# Patient Record
Sex: FEMALE | Race: NATIVE HAWAIIAN OR OTHER PACIFIC ISLANDER
[De-identification: names, ages, dates, MRNs, and addresses within clinical notes are randomized per-mention and may not be internally consistent; named-entity substitution may affect disease eponyms.]

---

## 2020-02-15 ENCOUNTER — HOSPITAL ENCOUNTER (EMERGENCY)
Dept: HOSPITAL 33 - ED | Age: 35
Discharge: HOME | End: 2020-02-15
Payer: COMMERCIAL

## 2020-02-15 VITALS — HEART RATE: 92 BPM | DIASTOLIC BLOOD PRESSURE: 101 MMHG | SYSTOLIC BLOOD PRESSURE: 155 MMHG

## 2020-02-15 VITALS — OXYGEN SATURATION: 98 %

## 2020-02-15 DIAGNOSIS — E87.6: ICD-10-CM

## 2020-02-15 DIAGNOSIS — R07.89: ICD-10-CM

## 2020-02-15 DIAGNOSIS — E11.9: ICD-10-CM

## 2020-02-15 DIAGNOSIS — I10: ICD-10-CM

## 2020-02-15 DIAGNOSIS — E78.00: ICD-10-CM

## 2020-02-15 DIAGNOSIS — K20.9: Primary | ICD-10-CM

## 2020-02-15 DIAGNOSIS — Z79.899: ICD-10-CM

## 2020-02-15 LAB
ALBUMIN SERPL-MCNC: 4.1 G/DL (ref 3.5–5)
ALP SERPL-CCNC: 73 U/L (ref 38–126)
ALT SERPL-CCNC: 18 U/L (ref 0–35)
AMYLASE SERPL-CCNC: 62 U/L (ref 30–110)
ANION GAP SERPL CALC-SCNC: 10.4 MEQ/L (ref 5–15)
APTT PPP: 33.7 SECONDS (ref 25.3–37)
AST SERPL QL: 23 U/L (ref 14–36)
BASOPHILS # BLD AUTO: 0.04 10*3/UL (ref 0–0.4)
BASOPHILS NFR BLD AUTO: 0.4 % (ref 0–0.4)
BILIRUB BLD-MCNC: 0.5 MG/DL (ref 0.2–1.3)
BNP SERPL-MCNC: 35.6 PG/ML (ref 0–450)
BUN SERPL-MCNC: 7 MG/DL (ref 7–17)
CALCIUM SPEC-MCNC: 8.8 MG/DL (ref 8.4–10.2)
CHLORIDE SERPL-SCNC: 104 MMOL/L (ref 98–107)
CO2 SERPL-SCNC: 29 MMOL/L (ref 22–30)
CREAT SERPL-MCNC: 0.73 MG/DL (ref 0.52–1.04)
D DIMER BLD IA.RAPID-MCNC: 239 NG/ML (ref 215–500)
EOSINOPHIL # BLD AUTO: 0.21 10*3/UL (ref 0–0.5)
GLUCOSE SERPL-MCNC: 152 MG/DL (ref 74–106)
GLUCOSE UR-MCNC: NEGATIVE MG/DL
HCT VFR BLD AUTO: 41.9 % (ref 35–47)
HGB BLD-MCNC: 14.2 GM/DL (ref 12–16)
INR PPP: 1.18 (ref 0.8–3)
LIPASE SERPL-CCNC: 143 U/L (ref 23–300)
LYMPHOCYTES # SPEC AUTO: 2.47 10*3/UL (ref 1–4.6)
MCH RBC QN AUTO: 30.3 PG (ref 26–32)
MCHC RBC AUTO-ENTMCNC: 33.9 G/DL (ref 32–36)
MONOCYTES # BLD AUTO: 0.57 10*3/UL (ref 0–1.3)
PLATELET # BLD AUTO: 251 K/MM3 (ref 150–450)
POTASSIUM SERPLBLD-SCNC: 3.3 MMOL/L (ref 3.5–5.1)
PROT SERPL-MCNC: 7.5 G/DL (ref 6.3–8.2)
PROT UR STRIP-MCNC: NEGATIVE MG/DL
PROTHROMBIN TIME: 13.4 SECONDS (ref 9.95–12.35)
RBC # BLD AUTO: 4.68 M/MM3 (ref 4.1–5.4)
RBC #/AREA URNS HPF: (no result) /HPF (ref 0–2)
SODIUM SERPL-SCNC: 140 MMOL/L (ref 137–145)
WBC # BLD AUTO: 10.9 K/MM3 (ref 4–10.5)
WBC #/AREA URNS HPF: (no result) /HPF (ref 0–5)

## 2020-02-15 PROCEDURE — 36000 PLACE NEEDLE IN VEIN: CPT

## 2020-02-15 PROCEDURE — 83880 ASSAY OF NATRIURETIC PEPTIDE: CPT

## 2020-02-15 PROCEDURE — 96375 TX/PRO/DX INJ NEW DRUG ADDON: CPT

## 2020-02-15 PROCEDURE — 83605 ASSAY OF LACTIC ACID: CPT

## 2020-02-15 PROCEDURE — 96360 HYDRATION IV INFUSION INIT: CPT

## 2020-02-15 PROCEDURE — 85025 COMPLETE CBC W/AUTO DIFF WBC: CPT

## 2020-02-15 PROCEDURE — 82150 ASSAY OF AMYLASE: CPT

## 2020-02-15 PROCEDURE — 96374 THER/PROPH/DIAG INJ IV PUSH: CPT

## 2020-02-15 PROCEDURE — 84484 ASSAY OF TROPONIN QUANT: CPT

## 2020-02-15 PROCEDURE — 93005 ELECTROCARDIOGRAM TRACING: CPT

## 2020-02-15 PROCEDURE — 85379 FIBRIN DEGRADATION QUANT: CPT

## 2020-02-15 PROCEDURE — 85730 THROMBOPLASTIN TIME PARTIAL: CPT

## 2020-02-15 PROCEDURE — 99285 EMERGENCY DEPT VISIT HI MDM: CPT

## 2020-02-15 PROCEDURE — 80053 COMPREHEN METABOLIC PANEL: CPT

## 2020-02-15 PROCEDURE — 83690 ASSAY OF LIPASE: CPT

## 2020-02-15 PROCEDURE — 71045 X-RAY EXAM CHEST 1 VIEW: CPT

## 2020-02-15 PROCEDURE — 36415 COLL VENOUS BLD VENIPUNCTURE: CPT

## 2020-02-15 PROCEDURE — 87086 URINE CULTURE/COLONY COUNT: CPT

## 2020-02-15 PROCEDURE — 93041 RHYTHM ECG TRACING: CPT

## 2020-02-15 PROCEDURE — 85610 PROTHROMBIN TIME: CPT

## 2020-02-15 PROCEDURE — 81001 URINALYSIS AUTO W/SCOPE: CPT

## 2020-02-15 NOTE — XRAY
Indication: Chest pain.



Comparison: None



Portable chest demonstrates normal heart, lungs, and bony thorax.

## 2020-02-15 NOTE — ERPHSYRPT
- History of Present Illness


Time Seen by Provider: 02/15/20 17:25


Historian: patient


Exam Limitations: no limitations


Patient Subjective Stated Complaint: Pain in medial chest that radiates to her 

back for past 2 days, pt is on Clindamycin for a sinus infection


Triage Nursing Assessment: Pt brought in to the ER by her , hypertensive

, no edema, rates pain 7/10, S1-S2 sounds heard, lungs clear,


Physician History: 





It is a 34-year-old white female who presents with a complaint of substernal 

chest pain which radiates through to the back for 2 days.  The pain seems to be 

getting worse it is worse when she lays down and she cannot sleep because of 

the pain she initially had some fever and was being treated for a sinus 

infection with Cleocin.  She also has some cough.  Her medicine and the Cleocin 

is new to her.  This started Thursday evening she does have multiple risk 

factors for heart disease including diabetes smoking hypertension positive 

family history and hyperlipidemia.


Timing/Duration: yesterday (2), worse


Activities at Onset: none


Quality: fullness, pressure


Location: substernal


Chest Pain Radiation: back


Severity of Pain-Max: moderate


Severity of Pain-Current: moderate


Modifying Factors: Improves With: nothing


Associated Symptoms: denies symptoms


Prior Chest Pain/Cardiac Workup: no prior chest pain


Nitro Today/Relief: no nitro taken today


Aspirin Treatment Today: no aspirin today


Allergies/Adverse Reactions: 








No Known Drug Allergies Allergy (Verified 02/15/20 17:44)


 





Home Medications: 








Fenofibrate 40 mg PO DAILY 02/15/20 [History]


Hydrochlorothiazide 12.5 mg PO DAILY 02/15/20 [History]


Metformin HCl 500 mg*** [Glucophage 500 MG***] 500 mg PO BIDWM 02/15/20 [History

]


Metoprolol Tartrate 50 mg PO BID 02/15/20 [History]


Nitroglycerin 0.4 mg Tablet*** [Nitrostat 0.4 MG Tablet***] 0.4 mg SL UD PRN 02/

15/20 [History]


Potassium Chloride 10 Meq Tab* [Klor Con 10 MEQ***] 10 meq PO DAILY 02/15/20 [

History]


lisinopriL [Zestril] 40 mg PO BID 02/15/20 [History]








- Review of Systems


Constitutional: Fever, No Chills


Eyes: No Symptoms


Ears, Nose, & Throat: No Symptoms


Respiratory: No Cough, No Dyspnea


Cardiac: Chest Pain, No Edema, No Syncope


Abdominal/Gastrointestinal: No Abdominal Pain, No Nausea, No Vomiting, No 

Diarrhea


Genitourinary Symptoms: No Dysuria


Musculoskeletal: No Back Pain, No Neck Pain


Skin: No Rash


Neurological: No Dizziness, No Focal Weakness, No Sensory Changes


Psychological: No Symptoms


Endocrine: No Symptoms


All Other Systems: Reviewed and Negative





- Past Medical History


Pertinent Past Medical History: Yes


Cardiac History: High Cholesterol, Hypertension


Endocrine Medical History: Diabetes Type II


Other Medical History: arteries of the heart spasm.  hypokalemia





- Past Surgical History


Past Surgical History: Yes


Gastrointestinal: Cholecystectomy


Female Surgical History: Hysterectomy, Tubal Ligation





- Social History


Smoking Status: Current every day smoker


How long have you smoked: 17 years


Exposure to second hand smoke: Yes


Drug Use: none


Patient Lives Alone: No





- Female History


Hx Pregnant Now: No





- Nursing Vital Signs


Nursing Vital Signs: 


 Initial Vital Signs











Temperature  97.9 F   02/15/20 17:30


 


Pulse Rate  98 H  02/15/20 17:30


 


Blood Pressure  158/100   02/15/20 17:30


 


O2 Sat by Pulse Oximetry  97   02/15/20 17:30








 Pain Scale











Pain Intensity                 4

















- Physical Exam


General Appearance: moderate distress


Eye Exam: PERRL/EOMI, eyes nml inspection


Ears, Nose, Throat Exam: normal ENT inspection, moist mucous membranes


Neck Exam: normal inspection, non-tender, supple, full range of motion


Respiratory Exam: normal breath sounds, lungs clear, No respiratory distress


Cardiovascular Exam: regular rate/rhythm, normal heart sounds


Gastrointestinal/Abdomen Exam: soft, No tenderness, No mass


Back Exam: normal inspection, No CVA tenderness, No vertebral tenderness


Extremity Exam: normal inspection, normal range of motion


Neurologic Exam: alert, oriented x 3, cooperative, normal mood/affect, 

sensation nml, No motor deficits


Skin Exam: normal color, warm, dry


Lymphatic Exam: No adenopathy


**SpO2 Interpretation**: normal


SpO2: 98


O2 Delivery: Room Air





- Course


Nursing assessment & vital signs reviewed: Yes


EKG Interpreted by Me: RATE ([id number]), Sinus Rhythm, NORMAL AXIS, NORMAL INTERVALS, 

NORMAL QRS, Non-specific ST Changes





- Radiology Exams


  ** Chest


X-ray Interpretation: Interpreted by me, Negative


Ordered Tests: 


 Active Orders 24 hr











 Category Date Time Status


 


 Cardiac Monitor STAT Care  02/15/20 17:29 Active


 


 EKG-ER Only STAT Care  02/15/20 17:28 Active


 


 IV Insertion STAT Care  02/15/20 17:28 Active


 


 CHEST 1 VIEW (PORTABLE) Stat Exams  02/15/20 17:29 Completed


 


 AMYLASE Stat Lab  02/15/20 17:57 Completed


 


 CBC W DIFF Stat Lab  02/15/20 17:57 Completed


 


 CMP Stat Lab  02/15/20 17:57 Completed


 


 CULTURE,URINE Stat Lab  02/15/20 17:59 Received


 


 D-DIMER QUANTITATIVE Stat Lab  02/15/20 17:57 Completed


 


 LIPASE Stat Lab  02/15/20 17:57 Completed


 


 Lactic Acid Stat Lab  02/15/20 17:31 Completed


 


 NT PRO BNP Stat Lab  02/15/20 17:57 Completed


 


 PROTIME WITH INR Stat Lab  02/15/20 17:57 Completed


 


 PTT Stat Lab  02/15/20 17:57 Completed


 


 TROPONIN Q3H Lab  02/15/20 17:57 Completed


 


 TROPONIN Q3H Lab  02/15/20 20:30 Ordered


 


 TROPONIN Q3H Lab  02/15/20 23:30 Ordered


 


 TROPONIN Q3H Lab  02/16/20 02:30 Ordered


 


 TROPONIN Q3H Lab  02/16/20 05:30 Ordered


 


 UA W/RFX UR CULTURE Stat Lab  02/15/20 17:59 Completed








Medication Summary














Discontinued Medications














Generic Name Dose Route Start Last Admin





  Trade Name Freq  PRN Reason Stop Dose Admin


 


Al Hydrox/Mg Hydrox/Simethicone  Confirm  02/15/20 17:53  





  Maalox Es 30 Ml Unit Dose***  Administered  02/15/20 17:54  





  Dose   





  30 ml   





  .ROUTE   





  .STK-MED ONE   





     





     





     





     


 


Hydromorphone HCl  1 mg  02/15/20 17:33  02/15/20 17:58





  Hydromorphone 1 Mg/Ml Ampule***  IV  02/15/20 17:34  1 mg





  STAT ONE   Administration





     





     





     





     


 


Hydromorphone HCl  Confirm  02/15/20 17:52  





  Hydromorphone 1 Mg/Ml Ampule***  Administered  02/15/20 17:53  





  Dose   





  1 mg   





  .ROUTE   





  .STK-MED ONE   





     





     





     





     


 


Sodium Chloride  1,000 mls @ 999 mls/hr  02/15/20 17:28  02/15/20 17:58





  Sodium Chloride 0.9% 1000 Ml  IV  02/15/20 18:28  999 mls/hr





  .Q1H1M STA   Administration





     





     





     





     


 


Sodium Chloride  Confirm  02/15/20 17:53  





  Sodium Chloride 0.9% 1000 Ml  Administered  02/15/20 17:54  





  Dose   





  1,000 mls @ ud   





  .ROUTE   





  .STK-MED ONE   





     





     





     





     


 


Lidocaine HCl  Confirm  02/15/20 17:53  





  Xylocaine Hcl Viscous *  Administered  02/15/20 17:54  





  Dose   





  15 ml   





  .ROUTE   





  .STK-MED ONE   





     





     





     





     


 


Magnesium Hydroxide  45 ml  02/15/20 17:31  02/15/20 17:59





  Gi Cocktail 45 Ml (Maalox/Lidocaine)  PO  02/15/20 17:32  45 ml





  STAT ONE   Administration





     





     





     





     


 


Ondansetron HCl  4 mg  02/15/20 17:28  02/15/20 17:58





  Zofran 4 Mg/2 Ml Vial**  IV  02/15/20 17:29  4 mg





  STAT ONE   Administration





     





     





     





     


 


Ondansetron HCl  Confirm  02/15/20 17:52  





  Zofran 4 Mg/2 Ml Vial**  Administered  02/15/20 17:53  





  Dose   





  4 mg   





  .ROUTE   





  .STK-MED ONE   





     





     





     





     


 


Pantoprazole Sodium  40 mg  02/15/20 17:30  02/15/20 17:58





  Protonix 40 Mg Iv***  IV  02/15/20 17:31  40 mg





  STAT ONE   Administration





     





     





     





     


 


Pantoprazole Sodium  Confirm  02/15/20 17:52  





  Protonix 40 Mg Iv***  Administered  02/15/20 17:53  





  Dose   





  40 mg   





  IV   





  .STK-MED ONE   





     





     





     





     


 


Potassium Chloride  Confirm  02/15/20 18:36  





  Klor Con 10 Meq***  Administered  02/15/20 18:37  





  Dose   





  40 meq   





  PO   





  .STK-MED ONE   





     





     





     





     


 


Potassium Chloride  40 meq  02/15/20 18:37  02/15/20 18:43





  Klor Con 10 Meq***  PO  02/15/20 18:38  40 meq





  STAT ONE   Administration





     





     





     





     











Lab/Rad Data: 


 Laboratory Result Diagrams





 02/15/20 17:57 





 02/15/20 17:57 





 Laboratory Results











  02/15/20 02/15/20 02/15/20 Range/Units





  17:59 17:57 17:57 


 


WBC     (4.0-10.5)  K/mm3


 


RBC     (4.1-5.4)  M/mm3


 


Hgb     (12.0-16.0)  gm/dl


 


Hct     (35-47)  %


 


MCV     ()  fl


 


MCH     (26-32)  pg


 


MCHC     (32-36)  g/dl


 


RDW     (11.5-14.0)  %


 


Plt Count     (150-450)  K/mm3


 


MPV     (7.5-11.0)  fl


 


Gran %     (36.0-66.0)  %


 


Eos # (Auto)     (0-0.5)  


 


Absolute Lymphs (auto)     (1.0-4.6)  


 


Absolute Monos (auto)     (0.0-1.3)  


 


Lymphocytes %     (24.0-44.0)  %


 


Monocytes %     (0.0-12.0)  %


 


Eosinophils %     (0.00-5.0)  %


 


Basophils %     (0.0-0.4)  %


 


Absolute Granulocytes     (1.4-6.9)  


 


Basophils #     (0-0.4)  


 


PT    13.4 H  (9.95-12.35)  SECONDS


 


INR    1.18  (0.8-3.0)  


 


APTT    33.7  (25.3-37.0)  SECONDS


 


D-Dimer    239  (215-500)  ng/mL


 


Sodium     (137-145)  mmol/L


 


Potassium     (3.5-5.1)  mmol/L


 


Chloride     ()  mmol/L


 


Carbon Dioxide     (22-30)  mmol/L


 


Anion Gap     (5-15)  MEQ/L


 


BUN     (7-17)  mg/dL


 


Creatinine     (0.52-1.04)  mg/dL


 


Estimated GFR     ML/MIN


 


Glucose     ()  mg/dL


 


Lactic Acid     (0.4-2.0)  


 


Calcium     (8.4-10.2)  mg/dL


 


Total Bilirubin     (0.2-1.3)  mg/dL


 


AST     (14-36)  U/L


 


ALT     (0-35)  U/L


 


Alkaline Phosphatase     ()  U/L


 


Troponin I   < 0.012   (0.000-0.034)  ng/mL


 


NT-Pro-B Natriuret Pep     (0-450)  pg/mL


 


Serum Total Protein     (6.3-8.2)  g/dL


 


Albumin     (3.5-5.0)  g/dL


 


Amylase     ()  U/L


 


Lipase     ()  U/L


 


Urine Color  YELLOW    (YELLOW)  


 


Urine Appearance  SLIGHTLY CLOUDY    (CLEAR)  


 


Urine pH  7.0    (5-6)  


 


Ur Specific Gravity  1.008    (1.005-1.025)  


 


Urine Protein  NEGATIVE    (Negative)  


 


Urine Ketones  NEGATIVE    (NEGATIVE)  


 


Urine Blood  MODERATE    (0-5)  Bridger/ul


 


Urine Nitrite  NEGATIVE    (NEGATIVE)  


 


Urine Bilirubin  NEGATIVE    (NEGATIVE)  


 


Urine Urobilinogen  NEGATIVE    (0-1)  mg/dL


 


Ur Leukocyte Esterase  NEGATIVE    (NEGATIVE)  


 


Urine WBC (Auto)  NONE    (0-5)  /HPF


 


Urine RBC (Auto)  3-5    (0-2)  /HPF


 


U Epithel Cells (Auto)  RARE    (FEW)  /HPF


 


Urine Bacteria (Auto)  MODERATE    (NEGATIVE)  /HPF


 


Urine Culture Reflexed  YES    (NO)  


 


Urine Glucose  NEGATIVE    (NEGATIVE)  mg/dL














  02/15/20 02/15/20 02/15/20 Range/Units





  17:57 17:57 17:31 


 


WBC   10.9 H   (4.0-10.5)  K/mm3


 


RBC   4.68   (4.1-5.4)  M/mm3


 


Hgb   14.2   (12.0-16.0)  gm/dl


 


Hct   41.9   (35-47)  %


 


MCV   89.5   ()  fl


 


MCH   30.3   (26-32)  pg


 


MCHC   33.9   (32-36)  g/dl


 


RDW   13.8   (11.5-14.0)  %


 


Plt Count   251   (150-450)  K/mm3


 


MPV   10.8   (7.5-11.0)  fl


 


Gran %   69.9 H   (36.0-66.0)  %


 


Eos # (Auto)   0.21   (0-0.5)  


 


Absolute Lymphs (auto)   2.47   (1.0-4.6)  


 


Absolute Monos (auto)   0.57   (0.0-1.3)  


 


Lymphocytes %   22.6 L   (24.0-44.0)  %


 


Monocytes %   5.2   (0.0-12.0)  %


 


Eosinophils %   1.9   (0.00-5.0)  %


 


Basophils %   0.4   (0.0-0.4)  %


 


Absolute Granulocytes   7.62 H   (1.4-6.9)  


 


Basophils #   0.04   (0-0.4)  


 


PT     (9.95-12.35)  SECONDS


 


INR     (0.8-3.0)  


 


APTT     (25.3-37.0)  SECONDS


 


D-Dimer     (215-500)  ng/mL


 


Sodium  140    (137-145)  mmol/L


 


Potassium  3.3 L    (3.5-5.1)  mmol/L


 


Chloride  104    ()  mmol/L


 


Carbon Dioxide  29    (22-30)  mmol/L


 


Anion Gap  10.4    (5-15)  MEQ/L


 


BUN  7    (7-17)  mg/dL


 


Creatinine  0.73    (0.52-1.04)  mg/dL


 


Estimated GFR  > 60.0    ML/MIN


 


Glucose  152 H    ()  mg/dL


 


Lactic Acid    1.0  (0.4-2.0)  


 


Calcium  8.8    (8.4-10.2)  mg/dL


 


Total Bilirubin  0.50    (0.2-1.3)  mg/dL


 


AST  23    (14-36)  U/L


 


ALT  18    (0-35)  U/L


 


Alkaline Phosphatase  73    ()  U/L


 


Troponin I     (0.000-0.034)  ng/mL


 


NT-Pro-B Natriuret Pep  35.6    (0-450)  pg/mL


 


Serum Total Protein  7.5    (6.3-8.2)  g/dL


 


Albumin  4.1    (3.5-5.0)  g/dL


 


Amylase  62    ()  U/L


 


Lipase  143    ()  U/L


 


Urine Color     (YELLOW)  


 


Urine Appearance     (CLEAR)  


 


Urine pH     (5-6)  


 


Ur Specific Gravity     (1.005-1.025)  


 


Urine Protein     (Negative)  


 


Urine Ketones     (NEGATIVE)  


 


Urine Blood     (0-5)  Bridger/ul


 


Urine Nitrite     (NEGATIVE)  


 


Urine Bilirubin     (NEGATIVE)  


 


Urine Urobilinogen     (0-1)  mg/dL


 


Ur Leukocyte Esterase     (NEGATIVE)  


 


Urine WBC (Auto)     (0-5)  /HPF


 


Urine RBC (Auto)     (0-2)  /HPF


 


U Epithel Cells (Auto)     (FEW)  /HPF


 


Urine Bacteria (Auto)     (NEGATIVE)  /HPF


 


Urine Culture Reflexed     (NO)  


 


Urine Glucose     (NEGATIVE)  mg/dL














- Progress


Progress: unchanged


Air Movement: good


Blood Culture(s) Obtained: No


Antibiotics given: No





- Departure


Departure Disposition: Home


Clinical Impression: 


 Esophagitis due to drug





Condition: Stable


Critical Care Time: No


Instructions:  Acid Reflux (GERD), Adolescent (DC), Chest Pain (DC)


Prescriptions: 


Hydrocodone/APAP 5-325 Tab^^^ [Norco 5-325 Tablet^^^] 1 tab PO Q6HPRN PRN #10 

tablet MDD 6


 PRN Reason: Pain


PANTOPRAZOLE 40 mg Tablet*** [Protonix 40MG Tablet***] 40 mg PO QAM 30 Days #30 

tab